# Patient Record
Sex: MALE | Race: WHITE | Employment: FULL TIME | URBAN - METROPOLITAN AREA
[De-identification: names, ages, dates, MRNs, and addresses within clinical notes are randomized per-mention and may not be internally consistent; named-entity substitution may affect disease eponyms.]

---

## 2019-07-18 ENCOUNTER — HOSPITAL ENCOUNTER (EMERGENCY)
Age: 29
Discharge: HOME | End: 2019-07-19
Payer: COMMERCIAL

## 2019-07-18 DIAGNOSIS — S09.90XA INJURY OF HEAD, INITIAL ENCOUNTER: Primary | ICD-10-CM
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
## 2019-07-18 DIAGNOSIS — S00.03XA HEMATOMA OF SCALP, INITIAL ENCOUNTER: ICD-10-CM

## 2019-07-18 PROCEDURE — 99284 EMERGENCY DEPT VISIT MOD MDM: CPT

## 2019-07-19 ENCOUNTER — APPOINTMENT (OUTPATIENT)
Dept: CT IMAGING | Age: 29
End: 2019-07-19
Payer: COMMERCIAL

## 2019-07-19 VITALS
HEIGHT: 72 IN | OXYGEN SATURATION: 98 % | RESPIRATION RATE: 16 BRPM | BODY MASS INDEX: 32.51 KG/M2 | WEIGHT: 240 LBS | HEART RATE: 72 BPM | SYSTOLIC BLOOD PRESSURE: 138 MMHG | DIASTOLIC BLOOD PRESSURE: 72 MMHG | TEMPERATURE: 98 F

## 2019-07-19 PROCEDURE — 6370000000 HC RX 637 (ALT 250 FOR IP): Performed by: NURSE PRACTITIONER

## 2019-07-19 PROCEDURE — 70450 CT HEAD/BRAIN W/O DYE: CPT

## 2019-07-19 PROCEDURE — 72125 CT NECK SPINE W/O DYE: CPT

## 2019-07-19 RX ORDER — IBUPROFEN 800 MG/1
800 TABLET ORAL ONCE
Status: COMPLETED | OUTPATIENT
Start: 2019-07-19 | End: 2019-07-19

## 2019-07-19 RX ORDER — PREDNISONE 20 MG/1
20 TABLET ORAL DAILY
COMMUNITY

## 2019-07-19 RX ORDER — DICYCLOMINE HYDROCHLORIDE 10 MG/1
10 CAPSULE ORAL
COMMUNITY
Start: 2019-04-02 | End: 2020-04-01

## 2019-07-19 RX ORDER — DOXYCYCLINE HYCLATE 100 MG
100 TABLET ORAL 2 TIMES DAILY
COMMUNITY

## 2019-07-19 RX ADMIN — MOXIFLOXACIN HYDROCHLORIDE 800 MG: 400 TABLET, FILM COATED ORAL at 00:49

## 2019-07-19 ASSESSMENT — PAIN SCALES - GENERAL
PAINLEVEL_OUTOF10: 6
PAINLEVEL_OUTOF10: 6
PAINLEVEL_OUTOF10: 4

## 2019-07-19 NOTE — ED PROVIDER NOTES
Take 100 mg by mouth 2 times daily         ALLERGIES    No Known Allergies    FAMILY HISTORY    History reviewed. No pertinent family history. SOCIAL HISTORY    Social History     Socioeconomic History    Marital status: Single     Spouse name: None    Number of children: None    Years of education: None    Highest education level: None   Occupational History    None   Social Needs    Financial resource strain: None    Food insecurity:     Worry: None     Inability: None    Transportation needs:     Medical: None     Non-medical: None   Tobacco Use    Smoking status: Current Every Day Smoker     Packs/day: 0.25     Types: Cigarettes    Smokeless tobacco: Never Used   Substance and Sexual Activity    Alcohol use: Yes     Comment: social    Drug use: Yes     Types: Marijuana    Sexual activity: None   Lifestyle    Physical activity:     Days per week: None     Minutes per session: None    Stress: None   Relationships    Social connections:     Talks on phone: None     Gets together: None     Attends Yazidism service: None     Active member of club or organization: None     Attends meetings of clubs or organizations: None     Relationship status: None    Intimate partner violence:     Fear of current or ex partner: None     Emotionally abused: None     Physically abused: None     Forced sexual activity: None   Other Topics Concern    None   Social History Narrative    None       REVIEW OF SYSTEMS    10 systems reviewed, pertinent positives per HPI otherwise noted to be negative      PHYSICAL EXAM  Vitals:    07/19/19 0007   BP: (!) 150/89   Pulse: 67   Resp: 16   Temp: 98 °F (36.7 °C)   SpO2: 98%     GENERAL: Patient is well-developed, well-nourished,  no acute distress. No apparent discomfort. Non toxic appearing. HEENT:  Normocephalic. Atraumatic. There are no bony depressions, instability, step-off, or crepitus to palpation of the skull. There is no raccoon's eyes or battles sign observed. reasonable. Brenda Zacarias and I have discussed the diagnosis and risks, and we agree with discharging home to follow-up with their primary doctor. We also discussed returning to the Emergency Department immediately if new or worsening symptoms occur. We have discussed the symptoms which are most concerning (e.g., changing or worsening pain, weakness, vomiting, fever) that necessitate immediate return. Clinical Impression  1. Injury of head, initial encounter    2. Hematoma of scalp, initial encounter    3. Fall, initial encounter        Discharge Vital Signs:  Blood pressure (!) 150/89, pulse 67, temperature 98 °F (36.7 °C), resp. rate 16, height 6' (1.829 m), weight 240 lb (108.9 kg), SpO2 98 %. [facility]  ED  [address]  Go to   If symptoms worsen      DISPOSITION  Patient was discharged to home in good condition. Comment: Please note this report has been produced using speech recognition software and may contain errors related to that system including errors in grammar, punctuation, and spelling, as well as words and phrases that may be inappropriate. If there are any questions or concerns please feel free to contact the dictating provider for clarification.         Fran Rivera, TANJA - PERFECTO  07/19/19 8894

## 2022-04-19 ENCOUNTER — APPOINTMENT (OUTPATIENT)
Dept: URBAN - METROPOLITAN AREA CLINIC 188 | Age: 32
Setting detail: DERMATOLOGY
End: 2022-04-19

## 2022-10-24 ENCOUNTER — APPOINTMENT (OUTPATIENT)
Dept: URBAN - METROPOLITAN AREA CLINIC 188 | Age: 32
Setting detail: DERMATOLOGY
End: 2022-10-24

## 2023-04-06 ENCOUNTER — APPOINTMENT (OUTPATIENT)
Dept: URBAN - METROPOLITAN AREA CLINIC 188 | Age: 33
Setting detail: DERMATOLOGY
End: 2023-04-06

## 2024-01-04 ENCOUNTER — APPOINTMENT (OUTPATIENT)
Dept: URBAN - METROPOLITAN AREA CLINIC 188 | Age: 34
Setting detail: DERMATOLOGY
End: 2024-01-04

## 2024-04-01 ENCOUNTER — APPOINTMENT (OUTPATIENT)
Dept: URBAN - METROPOLITAN AREA CLINIC 188 | Age: 34
Setting detail: DERMATOLOGY
End: 2024-04-01

## 2024-06-12 ENCOUNTER — APPOINTMENT (OUTPATIENT)
Dept: URBAN - METROPOLITAN AREA CLINIC 188 | Age: 34
Setting detail: DERMATOLOGY
End: 2024-06-12